# Patient Record
Sex: MALE | Race: WHITE | NOT HISPANIC OR LATINO | Employment: FULL TIME | URBAN - METROPOLITAN AREA
[De-identification: names, ages, dates, MRNs, and addresses within clinical notes are randomized per-mention and may not be internally consistent; named-entity substitution may affect disease eponyms.]

---

## 2023-08-09 ENCOUNTER — HOSPITAL ENCOUNTER (EMERGENCY)
Facility: HOSPITAL | Age: 28
Discharge: HOME | End: 2023-08-09
Attending: EMERGENCY MEDICINE | Admitting: EMERGENCY MEDICINE
Payer: COMMERCIAL

## 2023-08-09 ENCOUNTER — APPOINTMENT (OUTPATIENT)
Dept: CT IMAGING | Facility: HOSPITAL | Age: 28
End: 2023-08-09
Payer: COMMERCIAL

## 2023-08-09 VITALS
DIASTOLIC BLOOD PRESSURE: 80 MMHG | BODY MASS INDEX: 35.31 KG/M2 | OXYGEN SATURATION: 96 % | WEIGHT: 233 LBS | HEIGHT: 68 IN | RESPIRATION RATE: 18 BRPM | SYSTOLIC BLOOD PRESSURE: 117 MMHG | TEMPERATURE: 97.5 F | HEART RATE: 69 BPM

## 2023-08-09 DIAGNOSIS — N20.1 URETEROLITHIASIS: ICD-10-CM

## 2023-08-09 DIAGNOSIS — R10.9 LEFT FLANK PAIN: Primary | ICD-10-CM

## 2023-08-09 LAB
ALBUMIN SERPL-MCNC: 5.1 G/DL (ref 3.5–5.2)
ALBUMIN/GLOB SERPL: 2.3 G/DL
ALP SERPL-CCNC: 75 U/L (ref 39–117)
ALT SERPL W P-5'-P-CCNC: 79 U/L (ref 1–41)
ANION GAP SERPL CALCULATED.3IONS-SCNC: 10 MMOL/L (ref 5–15)
AST SERPL-CCNC: 48 U/L (ref 1–40)
BACTERIA UR QL AUTO: ABNORMAL /HPF
BASOPHILS # BLD AUTO: 0.08 10*3/MM3 (ref 0–0.2)
BASOPHILS NFR BLD AUTO: 1.6 % (ref 0–1.5)
BILIRUB SERPL-MCNC: 0.7 MG/DL (ref 0–1.2)
BILIRUB UR QL STRIP: NEGATIVE
BUN SERPL-MCNC: 10 MG/DL (ref 6–20)
BUN/CREAT SERPL: 10.5 (ref 7–25)
CALCIUM SPEC-SCNC: 9.6 MG/DL (ref 8.6–10.5)
CHLORIDE SERPL-SCNC: 104 MMOL/L (ref 98–107)
CLARITY UR: CLEAR
CO2 SERPL-SCNC: 26 MMOL/L (ref 22–29)
COLOR UR: YELLOW
CREAT SERPL-MCNC: 0.95 MG/DL (ref 0.76–1.27)
DEPRECATED RDW RBC AUTO: 38.2 FL (ref 37–54)
EGFRCR SERPLBLD CKD-EPI 2021: 110.4 ML/MIN/1.73
EOSINOPHIL # BLD AUTO: 0.1 10*3/MM3 (ref 0–0.4)
EOSINOPHIL NFR BLD AUTO: 1.9 % (ref 0.3–6.2)
ERYTHROCYTE [DISTWIDTH] IN BLOOD BY AUTOMATED COUNT: 12.2 % (ref 12.3–15.4)
GLOBULIN UR ELPH-MCNC: 2.2 GM/DL
GLUCOSE SERPL-MCNC: 109 MG/DL (ref 65–99)
GLUCOSE UR STRIP-MCNC: NEGATIVE MG/DL
HCT VFR BLD AUTO: 49 % (ref 37.5–51)
HGB BLD-MCNC: 17 G/DL (ref 13–17.7)
HGB UR QL STRIP.AUTO: ABNORMAL
HOLD SPECIMEN: NORMAL
HOLD SPECIMEN: NORMAL
HYALINE CASTS UR QL AUTO: ABNORMAL /LPF
IMM GRANULOCYTES # BLD AUTO: 0.01 10*3/MM3 (ref 0–0.05)
IMM GRANULOCYTES NFR BLD AUTO: 0.2 % (ref 0–0.5)
KETONES UR QL STRIP: NEGATIVE
LEUKOCYTE ESTERASE UR QL STRIP.AUTO: NEGATIVE
LIPASE SERPL-CCNC: 57 U/L (ref 13–60)
LYMPHOCYTES # BLD AUTO: 1.24 10*3/MM3 (ref 0.7–3.1)
LYMPHOCYTES NFR BLD AUTO: 24 % (ref 19.6–45.3)
MCH RBC QN AUTO: 30 PG (ref 26.6–33)
MCHC RBC AUTO-ENTMCNC: 34.7 G/DL (ref 31.5–35.7)
MCV RBC AUTO: 86.4 FL (ref 79–97)
MONOCYTES # BLD AUTO: 0.44 10*3/MM3 (ref 0.1–0.9)
MONOCYTES NFR BLD AUTO: 8.5 % (ref 5–12)
NEUTROPHILS NFR BLD AUTO: 3.29 10*3/MM3 (ref 1.7–7)
NEUTROPHILS NFR BLD AUTO: 63.8 % (ref 42.7–76)
NITRITE UR QL STRIP: NEGATIVE
NRBC BLD AUTO-RTO: 0 /100 WBC (ref 0–0.2)
PH UR STRIP.AUTO: 7 [PH] (ref 5–8)
PLATELET # BLD AUTO: 172 10*3/MM3 (ref 140–450)
PMV BLD AUTO: 9.2 FL (ref 6–12)
POTASSIUM SERPL-SCNC: 4.1 MMOL/L (ref 3.5–5.2)
PROT SERPL-MCNC: 7.3 G/DL (ref 6–8.5)
PROT UR QL STRIP: NEGATIVE
RBC # BLD AUTO: 5.67 10*6/MM3 (ref 4.14–5.8)
RBC # UR STRIP: ABNORMAL /HPF
REF LAB TEST METHOD: ABNORMAL
SODIUM SERPL-SCNC: 140 MMOL/L (ref 136–145)
SP GR UR STRIP: 1.01 (ref 1–1.03)
SQUAMOUS #/AREA URNS HPF: ABNORMAL /HPF
UROBILINOGEN UR QL STRIP: ABNORMAL
WBC # UR STRIP: ABNORMAL /HPF
WBC NRBC COR # BLD: 5.16 10*3/MM3 (ref 3.4–10.8)
WHOLE BLOOD HOLD COAG: NORMAL
WHOLE BLOOD HOLD SPECIMEN: NORMAL

## 2023-08-09 PROCEDURE — 96360 HYDRATION IV INFUSION INIT: CPT

## 2023-08-09 PROCEDURE — 74176 CT ABD & PELVIS W/O CONTRAST: CPT

## 2023-08-09 PROCEDURE — 81001 URINALYSIS AUTO W/SCOPE: CPT | Performed by: NURSE PRACTITIONER

## 2023-08-09 PROCEDURE — 99284 EMERGENCY DEPT VISIT MOD MDM: CPT

## 2023-08-09 PROCEDURE — 80053 COMPREHEN METABOLIC PANEL: CPT | Performed by: NURSE PRACTITIONER

## 2023-08-09 PROCEDURE — 85025 COMPLETE CBC W/AUTO DIFF WBC: CPT | Performed by: NURSE PRACTITIONER

## 2023-08-09 PROCEDURE — 83690 ASSAY OF LIPASE: CPT | Performed by: NURSE PRACTITIONER

## 2023-08-09 RX ORDER — SODIUM CHLORIDE 0.9 % (FLUSH) 0.9 %
10 SYRINGE (ML) INJECTION AS NEEDED
Status: DISCONTINUED | OUTPATIENT
Start: 2023-08-09 | End: 2023-08-09 | Stop reason: HOSPADM

## 2023-08-09 RX ORDER — TAMSULOSIN HYDROCHLORIDE 0.4 MG/1
1 CAPSULE ORAL DAILY
Qty: 30 CAPSULE | Refills: 0 | Status: SHIPPED | OUTPATIENT
Start: 2023-08-09

## 2023-08-09 RX ORDER — ONDANSETRON 4 MG/1
4 TABLET, ORALLY DISINTEGRATING ORAL EVERY 8 HOURS PRN
Qty: 30 TABLET | Refills: 0 | Status: SHIPPED | OUTPATIENT
Start: 2023-08-09

## 2023-08-09 RX ORDER — HYDROCODONE BITARTRATE AND ACETAMINOPHEN 7.5; 325 MG/1; MG/1
1 TABLET ORAL EVERY 6 HOURS PRN
Qty: 10 TABLET | Refills: 0 | Status: SHIPPED | OUTPATIENT
Start: 2023-08-09

## 2023-08-09 RX ADMIN — SODIUM CHLORIDE 1000 ML: 9 INJECTION, SOLUTION INTRAVENOUS at 10:16

## 2023-08-09 NOTE — ED PROVIDER NOTES
MD ATTESTATION NOTE    The TIFFANY and I have discussed this patient's history, physical exam, and treatment plan.    I provided a substantive portion of the care of this patient. I personally performed the physical exam, in its entirety. The attached note describes my personal findings.      Angelito Huffman is a 28 y.o. male who presents to the ED c/o left flank pain.  Onset about 1 week ago.  He went to urgent care at that time and was found to have hematuria.  He was treated presumptively with a ureteral stone.  Plan was for expectant management.  However has had persistent symptoms which is why presents here today.  He reports having mild to minimal pain at this time.,  No fevers.      On exam:  GENERAL: not distressed  HENT: nares patent  EYES: no scleral icterus  CV: regular rhythm, regular rate  RESPIRATORY: normal effort  ABDOMEN: soft, nontender  MUSCULOSKELETAL: no deformity  NEURO: alert, moves all extremities, follows commands  SKIN: warm, dry    Labs  Recent Results (from the past 24 hour(s))   Urinalysis With Microscopic If Indicated (No Culture) - Urine, Clean Catch    Collection Time: 08/09/23 10:07 AM    Specimen: Urine, Clean Catch   Result Value Ref Range    Color, UA Yellow Yellow, Straw    Appearance, UA Clear Clear    pH, UA 7.0 5.0 - 8.0    Specific Gravity, UA 1.013 1.005 - 1.030    Glucose, UA Negative Negative    Ketones, UA Negative Negative    Bilirubin, UA Negative Negative    Blood, UA Trace (A) Negative    Protein, UA Negative Negative    Leuk Esterase, UA Negative Negative    Nitrite, UA Negative Negative    Urobilinogen, UA 1.0 E.U./dL 0.2 - 1.0 E.U./dL   Urinalysis, Microscopic Only - Urine, Clean Catch    Collection Time: 08/09/23 10:07 AM    Specimen: Urine, Clean Catch   Result Value Ref Range    RBC, UA 3-5 (A) None Seen, 0-2 /HPF    WBC, UA 3-5 (A) None Seen, 0-2 /HPF    Bacteria, UA None Seen None Seen /HPF    Squamous Epithelial Cells, UA 0-2 None Seen, 0-2 /HPF    Hyaline  Casts, UA 0-2 None Seen /LPF    Methodology Automated Microscopy    Comprehensive Metabolic Panel    Collection Time: 08/09/23 10:13 AM    Specimen: Blood   Result Value Ref Range    Glucose 109 (H) 65 - 99 mg/dL    BUN 10 6 - 20 mg/dL    Creatinine 0.95 0.76 - 1.27 mg/dL    Sodium 140 136 - 145 mmol/L    Potassium 4.1 3.5 - 5.2 mmol/L    Chloride 104 98 - 107 mmol/L    CO2 26.0 22.0 - 29.0 mmol/L    Calcium 9.6 8.6 - 10.5 mg/dL    Total Protein 7.3 6.0 - 8.5 g/dL    Albumin 5.1 3.5 - 5.2 g/dL    ALT (SGPT) 79 (H) 1 - 41 U/L    AST (SGOT) 48 (H) 1 - 40 U/L    Alkaline Phosphatase 75 39 - 117 U/L    Total Bilirubin 0.7 0.0 - 1.2 mg/dL    Globulin 2.2 gm/dL    A/G Ratio 2.3 g/dL    BUN/Creatinine Ratio 10.5 7.0 - 25.0    Anion Gap 10.0 5.0 - 15.0 mmol/L    eGFR 110.4 >60.0 mL/min/1.73   Lipase    Collection Time: 08/09/23 10:13 AM    Specimen: Blood   Result Value Ref Range    Lipase 57 13 - 60 U/L   Green Top (Gel)    Collection Time: 08/09/23 10:13 AM   Result Value Ref Range    Extra Tube Hold for add-ons.    Lavender Top    Collection Time: 08/09/23 10:13 AM   Result Value Ref Range    Extra Tube hold for add-on    Gold Top - SST    Collection Time: 08/09/23 10:13 AM   Result Value Ref Range    Extra Tube Hold for add-ons.    Light Blue Top    Collection Time: 08/09/23 10:13 AM   Result Value Ref Range    Extra Tube Hold for add-ons.    CBC Auto Differential    Collection Time: 08/09/23 10:13 AM    Specimen: Blood   Result Value Ref Range    WBC 5.16 3.40 - 10.80 10*3/mm3    RBC 5.67 4.14 - 5.80 10*6/mm3    Hemoglobin 17.0 13.0 - 17.7 g/dL    Hematocrit 49.0 37.5 - 51.0 %    MCV 86.4 79.0 - 97.0 fL    MCH 30.0 26.6 - 33.0 pg    MCHC 34.7 31.5 - 35.7 g/dL    RDW 12.2 (L) 12.3 - 15.4 %    RDW-SD 38.2 37.0 - 54.0 fl    MPV 9.2 6.0 - 12.0 fL    Platelets 172 140 - 450 10*3/mm3    Neutrophil % 63.8 42.7 - 76.0 %    Lymphocyte % 24.0 19.6 - 45.3 %    Monocyte % 8.5 5.0 - 12.0 %    Eosinophil % 1.9 0.3 - 6.2 %     Basophil % 1.6 (H) 0.0 - 1.5 %    Immature Grans % 0.2 0.0 - 0.5 %    Neutrophils, Absolute 3.29 1.70 - 7.00 10*3/mm3    Lymphocytes, Absolute 1.24 0.70 - 3.10 10*3/mm3    Monocytes, Absolute 0.44 0.10 - 0.90 10*3/mm3    Eosinophils, Absolute 0.10 0.00 - 0.40 10*3/mm3    Basophils, Absolute 0.08 0.00 - 0.20 10*3/mm3    Immature Grans, Absolute 0.01 0.00 - 0.05 10*3/mm3    nRBC 0.0 0.0 - 0.2 /100 WBC       Radiology  CT Abdomen Pelvis Without Contrast    Result Date: 8/9/2023  CT ABDOMEN AND PELVIS WITHOUT IV CONTRAST  HISTORY: Left flank pain  TECHNIQUE: Radiation dose reduction techniques were utilized, including automated exposure control and exposure modulation based on body size. 3 mm images were obtained through the abdomen and pelvis without the administration of IV contrast. Lack of IV contrast limits evaluation of solid, visceral, and vascular structures. Sensitivity for underlying lesions and infection decreased.  COMPARISON: None  FINDINGS:  LOWER CHEST: Within normal limits.  ABDOMEN: Liver/Biliary Tract: Hepatomegaly (20 cm) with suggestion of hepatic steatosis, please correlate with history/laboratory values. Too small to characterize hypodensity in the inferior right lobe (axial image 56) favoring a cyst or hemangioma in the absence of risk factors. Further imaging could be considered as clinically indicated.  Spleen: Within normal limits.  Pancreas: Within normal limits.  Adrenals: Within normal limits.  Kidneys:  Mild left-sided hydroureteronephrosis with a 6 mm proximal ureteral stone. Noncontrast imaging of the right kidney is unremarkable. The bladder is incompletely distended.  Bowel:  No obstruction. Scattered colonic diverticula. Normal appendix.  Peritoneum: Within normal limits.  Vasculature:    Within normal limits.  Lymph Nodes:  Within normal limits.                             PELVIS:                                 Pelvic organs: Within normal limits.    Abdominal/Pelvic Wall: Within  normal limits.  BONES: Nonspecific sclerotic focus L5 through vertebral body favoring a bone island. Multilevel degenerative changes with slight anterior wedging, disc space narrowing and osteophytosis. MRI could be considered for further evaluation if clinically indicated..       1. Mild left-sided hydroureteronephrosis with a 6 mm proximal ureteral stone. 2. Hepatomegaly with suggestion of hepatic steatosis. 3. Please see above for additional findings/recommendations.  This report was finalized on 8/9/2023 10:46 AM by Dr. Erika Velazquez M.D.       Medications given in the ED:  Medications   sodium chloride 0.9 % flush 10 mL (has no administration in time range)   sodium chloride 0.9 % bolus 1,000 mL (1,000 mL Intravenous New Bag 8/9/23 1016)       Orders placed during this visit:  Orders Placed This Encounter   Procedures    CT Abdomen Pelvis Without Contrast    [place] Draw    Comprehensive Metabolic Panel    Lipase    Urinalysis With Microscopic If Indicated (No Culture) - Urine, Clean Catch    CBC Auto Differential    Urinalysis, Microscopic Only - Urine, Clean Catch    Urology (on-call MD unless specified)    Insert Peripheral IV    CBC & Differential    Green Top (Gel)    Lavender Top    Gold Top - SST    Light Blue Top       Medical Decision Making:  ED Course as of 08/09/23 1351   Wed Aug 09, 2023   1037 CT of the abdomen and pelvis independently interpreted by myself.  Patient has a proximal left ureteral stone.  Hydronephrosis noted. [TD]   1119 Phone call with dr. Ender antonio on call for urology.  Discussed the patient, relevant history, exam, diagnostics, ED findings/progress, and concerns. MD recommends pain control, flomax and office f/u [AH]   1134 CT Abdomen Pelvis Without Contrast  Per my independent interpretation is no obvious bowel obstruction appreciated, proximal left ureteral stone noted approximately 5.8 mm [AH]   1137 Patient is still pain-free, agreeable to discharge home and follow-up with  urology as an outpatient, advised to return if he develops any fevers, chills, difficulty passing urine or recurrence of pain [AH]      ED Course User Index  [AH] Jocelyn Almendarez APRN  [TD] Gerber Harris II, MD             Diagnosis  Final diagnoses:   Left flank pain   Ureterolithiasis          Gerber Harris II, MD  08/09/23 3320

## 2023-08-09 NOTE — ED PROVIDER NOTES
EMERGENCY DEPARTMENT ENCOUNTER    Room Number:  02/02  PCP: No primary care provider on file.  Historian: patient      HPI:  Chief Complaint: L flank pain  A complete HPI/ROS/PMH/PSH/SH/FH are unobtainable due to: nothing  Context: Angelito Huffman is a pleasant afebrile ambulatory 28 y.o.  male who presents to the ED c/o L flank pain intermittently      He states he has been having pain since Friday.  Seen at urgent care and noted to have hematuria at that time.  He denies any dysuria, fever or chills.  He has no known history of kidney stones.  He does state that he drink sweet tea fairly frequently.    States he has a history of GERD and thinks that he may have a history of gallbladder issues but pain was on his left side and is fairly certain that he may have a kidney stone.    He states he is currently pain-free    PAST MEDICAL HISTORY  Active Ambulatory Problems     Diagnosis Date Noted    No Active Ambulatory Problems     Resolved Ambulatory Problems     Diagnosis Date Noted    No Resolved Ambulatory Problems     No Additional Past Medical History         PAST SURGICAL HISTORY  History reviewed. No pertinent surgical history.      FAMILY HISTORY  History reviewed. No pertinent family history.      SOCIAL HISTORY  Social History     Socioeconomic History    Marital status: Single         ALLERGIES  Latex        REVIEW OF SYSTEMS  Review of Systems   Gastrointestinal:  Positive for abdominal pain.   Genitourinary:  Positive for hematuria.          PHYSICAL EXAM  ED Triage Vitals   Temp Heart Rate Resp BP SpO2   08/09/23 0959 08/09/23 0959 08/09/23 0959 08/09/23 1004 08/09/23 0959   97.5 øF (36.4 øC) 92 18 136/97 97 %      Temp src Heart Rate Source Patient Position BP Location FiO2 (%)   08/09/23 0959 08/09/23 0959 08/09/23 1004 08/09/23 1004 --   Oral Monitor Sitting Right arm        Physical Exam      GENERAL: Alert and oriented x 4, no acute distress  HENT: nares patent, mucous membranes are  moist and intact  EYES: no scleral icterus no injection  CV: regular rhythm, normal rate no murmur, no peripheral edema  RESPIRATORY: normal effort clear to auscultation all fields bilaterally, able to speak in full sentences  ABDOMEN: soft nontender diffusely, no rebound or guarding, no CVA tenderness appreciated, abdominal obesity present  MUSCULOSKELETAL: no deformity, normal active range of motion all extremities  NEURO: alert, moves all extremities, follows commands  PSYCH:  calm, cooperative  SKIN: warm, dry and intact    Vital signs and nursing notes reviewed.          LAB RESULTS  Recent Results (from the past 24 hour(s))   Urinalysis With Microscopic If Indicated (No Culture) - Urine, Clean Catch    Collection Time: 08/09/23 10:07 AM    Specimen: Urine, Clean Catch   Result Value Ref Range    Color, UA Yellow Yellow, Straw    Appearance, UA Clear Clear    pH, UA 7.0 5.0 - 8.0    Specific Gravity, UA 1.013 1.005 - 1.030    Glucose, UA Negative Negative    Ketones, UA Negative Negative    Bilirubin, UA Negative Negative    Blood, UA Trace (A) Negative    Protein, UA Negative Negative    Leuk Esterase, UA Negative Negative    Nitrite, UA Negative Negative    Urobilinogen, UA 1.0 E.U./dL 0.2 - 1.0 E.U./dL   Urinalysis, Microscopic Only - Urine, Clean Catch    Collection Time: 08/09/23 10:07 AM    Specimen: Urine, Clean Catch   Result Value Ref Range    RBC, UA 3-5 (A) None Seen, 0-2 /HPF    WBC, UA 3-5 (A) None Seen, 0-2 /HPF    Bacteria, UA None Seen None Seen /HPF    Squamous Epithelial Cells, UA 0-2 None Seen, 0-2 /HPF    Hyaline Casts, UA 0-2 None Seen /LPF    Methodology Automated Microscopy    Comprehensive Metabolic Panel    Collection Time: 08/09/23 10:13 AM    Specimen: Blood   Result Value Ref Range    Glucose 109 (H) 65 - 99 mg/dL    BUN 10 6 - 20 mg/dL    Creatinine 0.95 0.76 - 1.27 mg/dL    Sodium 140 136 - 145 mmol/L    Potassium 4.1 3.5 - 5.2 mmol/L    Chloride 104 98 - 107 mmol/L    CO2 26.0  22.0 - 29.0 mmol/L    Calcium 9.6 8.6 - 10.5 mg/dL    Total Protein 7.3 6.0 - 8.5 g/dL    Albumin 5.1 3.5 - 5.2 g/dL    ALT (SGPT) 79 (H) 1 - 41 U/L    AST (SGOT) 48 (H) 1 - 40 U/L    Alkaline Phosphatase 75 39 - 117 U/L    Total Bilirubin 0.7 0.0 - 1.2 mg/dL    Globulin 2.2 gm/dL    A/G Ratio 2.3 g/dL    BUN/Creatinine Ratio 10.5 7.0 - 25.0    Anion Gap 10.0 5.0 - 15.0 mmol/L    eGFR 110.4 >60.0 mL/min/1.73   Lipase    Collection Time: 08/09/23 10:13 AM    Specimen: Blood   Result Value Ref Range    Lipase 57 13 - 60 U/L   Green Top (Gel)    Collection Time: 08/09/23 10:13 AM   Result Value Ref Range    Extra Tube Hold for add-ons.    Lavender Top    Collection Time: 08/09/23 10:13 AM   Result Value Ref Range    Extra Tube hold for add-on    Gold Top - SST    Collection Time: 08/09/23 10:13 AM   Result Value Ref Range    Extra Tube Hold for add-ons.    Light Blue Top    Collection Time: 08/09/23 10:13 AM   Result Value Ref Range    Extra Tube Hold for add-ons.    CBC Auto Differential    Collection Time: 08/09/23 10:13 AM    Specimen: Blood   Result Value Ref Range    WBC 5.16 3.40 - 10.80 10*3/mm3    RBC 5.67 4.14 - 5.80 10*6/mm3    Hemoglobin 17.0 13.0 - 17.7 g/dL    Hematocrit 49.0 37.5 - 51.0 %    MCV 86.4 79.0 - 97.0 fL    MCH 30.0 26.6 - 33.0 pg    MCHC 34.7 31.5 - 35.7 g/dL    RDW 12.2 (L) 12.3 - 15.4 %    RDW-SD 38.2 37.0 - 54.0 fl    MPV 9.2 6.0 - 12.0 fL    Platelets 172 140 - 450 10*3/mm3    Neutrophil % 63.8 42.7 - 76.0 %    Lymphocyte % 24.0 19.6 - 45.3 %    Monocyte % 8.5 5.0 - 12.0 %    Eosinophil % 1.9 0.3 - 6.2 %    Basophil % 1.6 (H) 0.0 - 1.5 %    Immature Grans % 0.2 0.0 - 0.5 %    Neutrophils, Absolute 3.29 1.70 - 7.00 10*3/mm3    Lymphocytes, Absolute 1.24 0.70 - 3.10 10*3/mm3    Monocytes, Absolute 0.44 0.10 - 0.90 10*3/mm3    Eosinophils, Absolute 0.10 0.00 - 0.40 10*3/mm3    Basophils, Absolute 0.08 0.00 - 0.20 10*3/mm3    Immature Grans, Absolute 0.01 0.00 - 0.05 10*3/mm3    nRBC 0.0 0.0 -  0.2 /100 WBC       Ordered the above labs and reviewed the results.        RADIOLOGY  CT Abdomen Pelvis Without Contrast    Result Date: 8/9/2023  CT ABDOMEN AND PELVIS WITHOUT IV CONTRAST  HISTORY: Left flank pain  TECHNIQUE: Radiation dose reduction techniques were utilized, including automated exposure control and exposure modulation based on body size. 3 mm images were obtained through the abdomen and pelvis without the administration of IV contrast. Lack of IV contrast limits evaluation of solid, visceral, and vascular structures. Sensitivity for underlying lesions and infection decreased.  COMPARISON: None  FINDINGS:  LOWER CHEST: Within normal limits.  ABDOMEN: Liver/Biliary Tract: Hepatomegaly (20 cm) with suggestion of hepatic steatosis, please correlate with history/laboratory values. Too small to characterize hypodensity in the inferior right lobe (axial image 56) favoring a cyst or hemangioma in the absence of risk factors. Further imaging could be considered as clinically indicated.  Spleen: Within normal limits.  Pancreas: Within normal limits.  Adrenals: Within normal limits.  Kidneys:  Mild left-sided hydroureteronephrosis with a 6 mm proximal ureteral stone. Noncontrast imaging of the right kidney is unremarkable. The bladder is incompletely distended.  Bowel:  No obstruction. Scattered colonic diverticula. Normal appendix.  Peritoneum: Within normal limits.  Vasculature:    Within normal limits.  Lymph Nodes:  Within normal limits.                             PELVIS:                                 Pelvic organs: Within normal limits.    Abdominal/Pelvic Wall: Within normal limits.  BONES: Nonspecific sclerotic focus L5 through vertebral body favoring a bone island. Multilevel degenerative changes with slight anterior wedging, disc space narrowing and osteophytosis. MRI could be considered for further evaluation if clinically indicated..       1. Mild left-sided hydroureteronephrosis with a 6 mm  proximal ureteral stone. 2. Hepatomegaly with suggestion of hepatic steatosis. 3. Please see above for additional findings/recommendations.  This report was finalized on 8/9/2023 10:46 AM by Dr. Erika Velazquez M.D.       Ordered the above noted radiological studies. Reviewed by me in PACS.            PROCEDURES  Procedures        MEDICATIONS GIVEN IN ER  Medications   sodium chloride 0.9 % flush 10 mL (has no administration in time range)   sodium chloride 0.9 % bolus 1,000 mL (1,000 mL Intravenous New Bag 8/9/23 1016)                   MEDICAL DECISION MAKING, PROGRESS, and CONSULTS    All labs have been independently reviewed by me.  All radiology studies have been reviewed by me and I have also reviewed the radiology report.   EKG's independently viewed and interpreted by me.  Discussion below represents my analysis of pertinent findings related to patient's condition, differential diagnosis, treatment plan and final disposition.      Additional sources:  - Discussed/ obtained information from independent historians: History obtained from patient    - External (non-ED) record review: [facility] from this past week with urinalysis that shows negative nitrites, negative protein, positive RBC/blood    - Chronic or social conditions impacting care: None, patient has good social support    - Shared decision making: Discussed test results and treatment options with patient, he is agreeable to discharge home with Flomax, pain medication if pain returns and will follow-up with urology as an outpatient      Orders placed during this visit:  Orders Placed This Encounter   Procedures    CT Abdomen Pelvis Without Contrast    [place] Draw    Comprehensive Metabolic Panel    Lipase    Urinalysis With Microscopic If Indicated (No Culture) - Urine, Clean Catch    CBC Auto Differential    Urinalysis, Microscopic Only - Urine, Clean Catch    Insert Peripheral IV    CBC & Differential     Green Top (Gel)    Lavender Top    Gold Top - SST    Light Blue Top         Additional orders considered but not ordered:  I considered checking blood cultures on this patient but given reassuring exam I do not think it would add much to patient's clinical picture        Differential diagnosis includes but is not limited to:    Ureterolithiasis, bowel obstruction, discussed skeletal pain      Independent interpretation of labs, radiology studies, and discussions with consultants:  ED Course as of 08/09/23 1138   Wed Aug 09, 2023   1037 CT of the abdomen and pelvis independently interpreted by myself.  Patient has a proximal left ureteral stone.  Hydronephrosis noted. [TD]   1119 Phone call with dr. Ender antonio on call for urology.  Discussed the patient, relevant history, exam, diagnostics, ED findings/progress, and concerns. MD recommends pain control, flomax and office f/u [AH]   1134 CT Abdomen Pelvis Without Contrast  Per my independent interpretation is no obvious bowel obstruction appreciated, proximal left ureteral stone noted approximately 5.8 mm [AH]   1137 Patient is still pain-free, agreeable to discharge home and follow-up with urology as an outpatient, advised to return if he develops any fevers, chills, difficulty passing urine or recurrence of pain [AH]      ED Course User Index  [AH] Jocelyn Almendarez APRN  [TD] Gerber Harris II, MD             I have worn appropriate PPE during this patient encounter, sanitized my hands both with entering and exiting patient's room.      DIAGNOSIS  Final diagnoses:   Left flank pain   Ureterolithiasis         DISPOSITION  Home            Latest Documented Vital Signs:  As of 10:34 EDT  BP- 126/89 HR- 70 Temp- 97.5 øF (36.4 øC) (Oral) O2 sat- 97%              --    Please note that portions of this were completed with a voice recognition program.       Note Disclaimer: At [facility], we believe that sharing information builds trust and better relationships.  You are receiving this note because you are receiving care at [facility] or recently visited. It is possible you will see health information before a provider has talked with you about it. This kind of information can be easy to misunderstand. To help you fully understand what it means for your health, we urge you to discuss this note with your provider.             Jocelyn Almendarez, LINDSAY  08/09/23 1133

## 2024-04-26 ENCOUNTER — HOSPITAL ENCOUNTER (EMERGENCY)
Facility: HOSPITAL | Age: 29
Discharge: HOME | End: 2024-04-26
Attending: EMERGENCY MEDICINE
Payer: COMMERCIAL

## 2024-04-26 ENCOUNTER — APPOINTMENT (OUTPATIENT)
Dept: GENERAL RADIOLOGY | Facility: HOSPITAL | Age: 29
End: 2024-04-26
Payer: COMMERCIAL

## 2024-04-26 VITALS
WEIGHT: 225 LBS | DIASTOLIC BLOOD PRESSURE: 87 MMHG | RESPIRATION RATE: 16 BRPM | OXYGEN SATURATION: 100 % | SYSTOLIC BLOOD PRESSURE: 137 MMHG | HEIGHT: 68 IN | HEART RATE: 74 BPM | BODY MASS INDEX: 34.1 KG/M2 | TEMPERATURE: 97.1 F

## 2024-04-26 DIAGNOSIS — M25.462 KNEE EFFUSION, LEFT: ICD-10-CM

## 2024-04-26 DIAGNOSIS — S89.92XA INJURY OF LEFT PATELLA, INITIAL ENCOUNTER: ICD-10-CM

## 2024-04-26 DIAGNOSIS — M25.562 ACUTE PAIN OF LEFT KNEE: Primary | ICD-10-CM

## 2024-04-26 PROCEDURE — 73562 X-RAY EXAM OF KNEE 3: CPT

## 2024-04-26 PROCEDURE — 99283 EMERGENCY DEPT VISIT LOW MDM: CPT

## 2024-04-26 RX ORDER — HYDROCODONE BITARTRATE AND ACETAMINOPHEN 5; 325 MG/1; MG/1
1 TABLET ORAL EVERY 6 HOURS PRN
Qty: 12 TABLET | Refills: 0 | Status: SHIPPED | OUTPATIENT
Start: 2024-04-26

## 2024-04-26 RX ORDER — HYDROCODONE BITARTRATE AND ACETAMINOPHEN 5; 325 MG/1; MG/1
1 TABLET ORAL ONCE
Status: COMPLETED | OUTPATIENT
Start: 2024-04-26 | End: 2024-04-26

## 2024-04-26 RX ORDER — NALOXONE HYDROCHLORIDE 4 MG/.1ML
SPRAY NASAL
Qty: 2 EACH | Refills: 0 | Status: SHIPPED | OUTPATIENT
Start: 2024-04-26

## 2024-04-26 RX ADMIN — HYDROCODONE BITARTRATE AND ACETAMINOPHEN 1 TABLET: 5; 325 TABLET ORAL at 11:09

## 2024-04-26 NOTE — ED PROVIDER NOTES
Pt presents to the ED c/o knee pain since Tuesday when he got injured while at a concert.  Has been having swelling and difficulty ambulating due to pain.     On exam,   General: No acute distress, nontoxic  HEENT: EOMI  Pulm: Symmetric chest rise, nonlabored breathing  CV: Regular rate and rhythm  GI: Nondistended  MSK: Left knee effusion with decreased range of motion due to pain and swelling tenderness over the anterior medial aspect  Skin: Warm, dry  Neuro: Awake, alert, oriented x 4, moving all extremities, no focal deficits  Psych: Calm, cooperative    Vital signs and nursing notes reviewed.           Plan: Plan for x-ray of the knee, supportive care, and reevaluation with results.  Concern for sprain, fracture.     ED Course as of 04/26/24 1333   Fri Apr 26, 2024   1140 XR Knee 3 View Left  Per my independent interpretation of the knee x-ray, no overtly obvious dislocation [DC-2]   1204 Patient presentation and evaluation consistent with acute left knee pain with evidence of patellar injury and knee effusion.  I do feel that there is potential for internal derangement of the ligaments in the knee and I feel he would benefit from orthopedic follow-up in short order with knee immobilizer and crutches.  Supportive care ordered at this time as well with anti-inflammatory cream and short course of narcotics.  Patient agreeable with this plan and all questions answered.  No indication for emergent consultation with orthopedics at this time. [DC]   1236 Patient has been educated about the use of crutches. [DC]      ED Course User Index  [DC] Hermelindo Strickland PA  [DC-2] Andi Good MD       X-ray findings today as noted with possible avulsion fracture and concern for possible nondisplaced avulsion fracture along the medial margin of the patella, moderate to large knee effusion noted.  Plan for knee immobilizer, crutches, orthopedic follow-up, short prescription for Norco for severe pain as needed, and ED return  for worsening symptoms as needed.     MD Attestation Note    SHARED VISIT: This visit was performed by BOTH a physician and an APC. The substantive portion of the medical decision making was performed by this attesting physician who made or approved the management plan and takes responsibility for patient management. All studies in the APC note (if performed) were independently interpreted by me.                   Andi Good MD  04/26/24 3805

## 2024-04-26 NOTE — ED PROVIDER NOTES
" EMERGENCY DEPARTMENT ENCOUNTER      Room Number:  S01/01  PCP: Provider, No Known  Independent Historians: Patient  Patient Care Team:  Provider, No Known as PCP - General       HPI:  Chief Complaint: Knee pain    A complete HPI/ROS/PMH/PSH/SH/FH are unobtainable due to: None    Chronic or social conditions impacting patient care (Social Determinants of Health): None      Context: Angelito Huffman is a 29 y.o. male who presents to the ED c/o acute left knee pain for the past several days.  The patient reports being at a concert on Tuesday night when someone fell into the back of his left knee.  He felt his knee \"give out\" at that time with abrupt intense aching discomfort.  Symptoms have only persisted since then despite the use of a knee brace.  He has been elevating the affected extremity and icing without much relief as well.  He has noticed associated swelling and decreased range of motion.  Ambulation does exacerbate symptoms as well.  No numbness or tingling distally.  No blood thinner use.      Upon review of prior external notes (non-ED) -and- Review of prior external test results outside of this encounter it appears the patient was evaluated in the office with urology for kidney stone on 8/17/2023.  The patient had a normal lipase on 8/9/2023 and an unremarkable CMP at that time.      PAST MEDICAL HISTORY  Active Ambulatory Problems     Diagnosis Date Noted    No Active Ambulatory Problems     Resolved Ambulatory Problems     Diagnosis Date Noted    No Resolved Ambulatory Problems     No Additional Past Medical History         PAST SURGICAL HISTORY  No past surgical history on file.      FAMILY HISTORY  No family history on file.      SOCIAL HISTORY  Social History     Socioeconomic History    Marital status: Single         ALLERGIES  Latex      REVIEW OF SYSTEMS  Included in HPI  All systems reviewed and negative except for those discussed in HPI.      PHYSICAL EXAM    I have reviewed the triage vital " signs and nursing notes.    ED Triage Vitals [04/26/24 1041]   Temp Heart Rate Resp BP SpO2   97.1 °F (36.2 °C) 74 16 -- 100 %      Temp src Heart Rate Source Patient Position BP Location FiO2 (%)   -- -- -- -- --       Physical Exam  Constitutional:       General: He is not in acute distress.     Appearance: He is well-developed.   HENT:      Head: Normocephalic and atraumatic.   Eyes:      General: No scleral icterus.     Conjunctiva/sclera: Conjunctivae normal.   Neck:      Trachea: No tracheal deviation.   Cardiovascular:      Rate and Rhythm: Normal rate and regular rhythm.   Pulmonary:      Effort: Pulmonary effort is normal.      Breath sounds: Normal breath sounds.   Abdominal:      Palpations: Abdomen is soft.      Tenderness: There is no abdominal tenderness. There is no guarding.   Musculoskeletal:         General: No deformity.      Cervical back: Normal range of motion.      Left knee: Swelling and effusion present. Decreased range of motion. Tenderness present.   Lymphadenopathy:      Cervical: No cervical adenopathy.   Skin:     General: Skin is warm and dry.   Neurological:      Mental Status: He is alert and oriented to person, place, and time.   Psychiatric:         Behavior: Behavior normal.         Vital signs and nursing notes reviewed.      PPE: I wore a surgical mask throughout my encounters with the pt. I performed hand hygiene on entry into the pt room and upon exit.      LAB RESULTS  No results found for this or any previous visit (from the past 24 hour(s)).      RADIOLOGY  XR Knee 3 View Left    Result Date: 4/26/2024  XR KNEE 3 VW LEFT-  INDICATIONS: Pain  TECHNIQUE: 4 VIEWS OF THE LEFT KNEE  COMPARISON: None available  FINDINGS:  There appears to be mild widening of the medial patellofemoral interval, that could be evidence of patellar retinaculum injury, with appearance suspicious for nondisplaced avulsion fracture fragment along the medial margin of the patella, suggest cross-sectional  imaging correlation such as MRI. Moderate to large knee effusion is present. No other evidence of fracture is identified.       As described.    This report was finalized on 4/26/2024 11:45 AM by Dr. Jovani Kaur M.D on Workstation: [id number]         MEDICATIONS GIVEN IN ER  Medications   HYDROcodone-acetaminophen (NORCO) 5-325 MG per tablet 1 tablet (1 tablet Oral Given 4/26/24 1109)         ORDERS PLACED DURING THIS VISIT:  Orders Placed This Encounter   Procedures    XR Knee 3 View Left         OUTPATIENT MEDICATION MANAGEMENT:  No current Epic-ordered facility-administered medications on file.     Current Outpatient Medications Ordered in Epic   Medication Sig Dispense Refill    Diclofenac Sodium (VOLTAREN) 1 % gel gel Apply 2 g topically to the appropriate area as directed 4 (Four) Times a Day As Needed (Knee pain). 50 g 0    HYDROcodone-acetaminophen (NORCO) 7.5-325 MG per tablet Take 1 tablet by mouth Every 6 (Six) Hours As Needed for Moderate Pain. 10 tablet 0    ondansetron ODT (ZOFRAN-ODT) 4 MG disintegrating tablet Place 1 tablet on the tongue Every 8 (Eight) Hours As Needed for Nausea or Vomiting. 30 tablet 0    tamsulosin (FLOMAX) 0.4 MG capsule 24 hr capsule Take 1 capsule by mouth Daily. 30 capsule 0             PROGRESS, DATA ANALYSIS, CONSULTS, AND MEDICAL DECISION MAKING  All labs have been independently interpreted by me.  All radiology studies have been reviewed by me. All EKG's have been independently viewed and interpreted by me.  Discussion below represents my analysis of pertinent findings related to patient's condition, differential diagnosis, treatment plan and final disposition.      DIFFERENTIAL DIAGNOSIS INCLUDE BUT NOT LIMITED TO:     Knee effusion, patellar fracture, tibial plateau fracture           ED Course as of 04/26/24 1205   Fri Apr 26, 2024   1204 Patient presentation and evaluation consistent with acute left knee pain with evidence of patellar injury and knee effusion.  I  do feel that there is potential for internal derangement of the ligaments in the knee and I feel he would benefit from orthopedic follow-up in short order with knee immobilizer and crutches.  Supportive care ordered at this time as well with anti-inflammatory cream and short course of narcotics.  Patient agreeable with this plan and all questions answered.  No indication for emergent consultation with orthopedics at this time. [DC]      ED Course User Index  [DC] Hermelindo Strickland, PA         1202 I rechecked the patient.  I discussed the patient's radiology findings (including all incidental findings), diagnosis, and plan for discharge.  A repeat exam reveals no new worrisome changes from my initial exam findings.  The patient understands that the fact that they are being discharged does not denote that nothing is abnormal, it indicates that no clinical emergency is present and that they must follow-up as directed in order to properly maintain their health.  Follow-up instructions (specifically listed below) and return to ER precautions were given at this time.  I specifically instructed the patient to follow-up with their PCP.  The patient understands and agrees with the plan, and is ready for discharge.  All questions answered.        AS OF 12:05 EDT VITALS:    BP - 137/87  HR - 74  TEMP - 97.1 °F (36.2 °C)  O2 SATS - 100%    COMPLEXITY OF CARE  Admission was considered but after careful review of the patient's presentation, physical examination, diagnostic results, and response to treatment the patient may be safely discharged with outpatient follow-up.      DIAGNOSIS  Final diagnoses:   Acute pain of left knee   Injury of left patella, initial encounter   Knee effusion, left         DISPOSITION  ED Disposition       ED Disposition   Discharge    Condition   Stable    Comment   --                Please note that portions of this document were completed with a voice recognition program.    Note Disclaimer: At  [facility], we believe that sharing information builds trust and better relationships. You are receiving this note because you recently visited [facility]. It is possible you will see health information before a provider has talked with you about it. This kind of information can be easy to misunderstand. To help you fully understand what it means for your health, we urge you to discuss this note with your provider.         Hermelindo Strickland PA  04/26/24 [id number]

## 2024-04-26 NOTE — ED NOTES
Patient to ER via car from home for L knee pain x Tuesday night reports someone fell into the back of the L knee